# Patient Record
Sex: MALE | Race: WHITE | NOT HISPANIC OR LATINO | URBAN - METROPOLITAN AREA
[De-identification: names, ages, dates, MRNs, and addresses within clinical notes are randomized per-mention and may not be internally consistent; named-entity substitution may affect disease eponyms.]

---

## 2024-09-28 ENCOUNTER — OFFICE VISIT (OUTPATIENT)
Dept: URGENT CARE | Age: 19
End: 2024-09-28
Payer: COMMERCIAL

## 2024-09-28 VITALS
OXYGEN SATURATION: 98 % | HEART RATE: 62 BPM | TEMPERATURE: 97 F | RESPIRATION RATE: 16 BRPM | SYSTOLIC BLOOD PRESSURE: 136 MMHG | DIASTOLIC BLOOD PRESSURE: 80 MMHG

## 2024-09-28 DIAGNOSIS — L08.9 SKIN INFECTION: Primary | ICD-10-CM

## 2024-09-28 RX ORDER — SULFAMETHOXAZOLE AND TRIMETHOPRIM 800; 160 MG/1; MG/1
1 TABLET ORAL 2 TIMES DAILY
Qty: 20 TABLET | Refills: 0 | Status: SHIPPED | OUTPATIENT
Start: 2024-09-28 | End: 2024-10-08

## 2024-09-28 ASSESSMENT — ENCOUNTER SYMPTOMS: WOUND: 1

## 2024-09-28 NOTE — PROGRESS NOTES
Subjective   Patient ID: Derick Caputo is a 19 y.o. male. They present today with a chief complaint of Wound Infection (Concern for infected abrasion, left elbow.).    History of Present Illness  Pt reported having an abrasion from a carpet burn on the left elbow, redness, swelling and pain rated 1/10 for 3 days. Pt denied bleeding and discharge. Neosporin cream did not improve the symptoms.       History provided by:  Patient   used: No        Past Medical History  Allergies as of 09/28/2024    (No Known Allergies)       (Not in a hospital admission)       History reviewed. No pertinent past medical history.    History reviewed. No pertinent surgical history.         Review of Systems  Review of Systems   Skin:  Positive for wound.                                  Objective    Vitals:    09/28/24 1342   BP: 136/80   Pulse: 62   Resp: 16   Temp: 36.1 °C (97 °F)   SpO2: 98%     No LMP for male patient.    Physical Exam  Vitals and nursing note reviewed.   Constitutional:       Appearance: Normal appearance.   HENT:      Head: Normocephalic and atraumatic.   Cardiovascular:      Rate and Rhythm: Normal rate and regular rhythm.   Pulmonary:      Effort: Pulmonary effort is normal.      Breath sounds: Normal breath sounds.   Skin:     General: Skin is warm.      Comments: An eschar noted on the left elbow. Surrounding tissue with redness, swelling and tenderness.    Neurological:      Mental Status: He is alert.         Procedures    Point of Care Test & Imaging Results from this visit  No results found for this visit on 09/28/24.   No results found.    Diagnostic study results (if any) were reviewed by JOAO Landa.    Assessment/Plan   Allergies, medications, history, and pertinent labs/EKGs/Imaging reviewed by JOAO Landa.     Take the antibiotic with food.  Eat yogurt or take probiotic once a day.  Symptoms should improve in 2 to 3 days.   Take Tylenol and/or ibuprofen  as needed for aches and pain and/or fever.  Return or follow-up with primary care provider if symptoms did not improve.  Call 911 or go to the nearest emergency room if symptoms became severe such as fever of 102.5 degrees Fahrenheit or 39.2 degrees Celsius, severe pain, shortness of breath, chest tightness.   Patient verbalized understanding of the instructions and left in stable condition.        Orders and Diagnoses  Diagnoses and all orders for this visit:  Skin infection      Medical Admin Record      Patient disposition: Home    Electronically signed by JOAO Landa  1:53 PM

## 2024-09-28 NOTE — PATIENT INSTRUCTIONS
Take the antibiotic with food.  Eat yogurt or take probiotic once a day.  Symptoms should improve in 2 to 3 days.   Take Tylenol and/or ibuprofen as needed for aches and pain and/or fever.  Return or follow-up with primary care provider if symptoms did not improve.  Call 911 or go to the nearest emergency room if symptoms became severe such as fever of 102.5 degrees Fahrenheit or 39.2 degrees Celsius, severe pain, shortness of breath, chest tightness.   Patient verbalized understanding of the instructions and left in stable condition.

## 2025-06-15 ENCOUNTER — OFFICE VISIT (OUTPATIENT)
Dept: URGENT CARE | Age: 20
End: 2025-06-15
Payer: COMMERCIAL

## 2025-06-15 VITALS
TEMPERATURE: 98.2 F | BODY MASS INDEX: 22.22 KG/M2 | HEIGHT: 69 IN | SYSTOLIC BLOOD PRESSURE: 112 MMHG | DIASTOLIC BLOOD PRESSURE: 74 MMHG | OXYGEN SATURATION: 99 % | RESPIRATION RATE: 18 BRPM | WEIGHT: 150 LBS | HEART RATE: 75 BPM

## 2025-06-15 DIAGNOSIS — S61.401A: Primary | ICD-10-CM

## 2025-06-15 DIAGNOSIS — Z23 NEED FOR DIPHTHERIA-TETANUS-PERTUSSIS (TDAP) VACCINE: ICD-10-CM

## 2025-06-15 RX ORDER — MUPIROCIN 20 MG/G
OINTMENT TOPICAL
Qty: 22 G | Refills: 0 | Status: SHIPPED | OUTPATIENT
Start: 2025-06-15 | End: 2025-06-25

## 2025-06-15 RX ORDER — CEPHALEXIN 500 MG/1
500 CAPSULE ORAL 2 TIMES DAILY
Qty: 14 CAPSULE | Refills: 0 | Status: SHIPPED | OUTPATIENT
Start: 2025-06-15 | End: 2025-06-22

## 2025-06-15 ASSESSMENT — ENCOUNTER SYMPTOMS: WOUND: 1

## 2025-06-15 NOTE — PROGRESS NOTES
"Subjective   Patient ID: Derick Caputo is a 20 y.o. male. They present today with a chief complaint of Injury (Laceration to RT palm last night after jumping in a bush//Pain).    History of Present Illness  Patient is a 20-year-old male presenting with concern for possible wound infection that was result of an injury yesterday.  He reports jumping through the bushes and cutting his right palm as result of it.  His main symptoms are redness and pain at the site.  He reports the injury took place over 12 hours ago and he just concern for infection.  He denies bleeding at the site, purulent discharge, numbness or tingling.  Patient is unaware of his last Tdap shot.    Past Medical History  Allergies as of 06/15/2025    (No Known Allergies)       Prescriptions Prior to Admission[1]     Medical History[2]    Surgical History[3]     reports that he has never smoked. He has never used smokeless tobacco.    Review of Systems  Review of Systems   Skin:  Positive for wound.   All other systems reviewed and are negative.                                 Objective    Vitals:    06/15/25 1558   BP: 112/74   BP Location: Left arm   Patient Position: Sitting   Pulse: 75   Resp: 18   Temp: 36.8 °C (98.2 °F)   TempSrc: Oral   SpO2: 99%   Weight: 68 kg (150 lb)   Height: 1.753 m (5' 9\")     No LMP for male patient.    Physical Exam  Vitals reviewed.   General: Vitals Noted. No distress. Normocephalic.  Cardiovascular:     Heart sounds: Normal heart sounds, S1 normal and S2 normal. No murmur heard.     No friction rub.   Pulmonary:      Effort: Pulmonary effort is normal.      Breath sounds: Normal breath sounds and air entry. Lungs clear to auscultation bilaterally   Musculoskeletal: Moves all extremities, no effusion, no edema.   ROM: full  Joint effusion: None  Right upper extremity: No edema.  Left upper extremity: No edema.  Right lower leg: No edema.   Left lower leg: No edema.   Skin:     Comments: Skin: Single " superficial laceration to the right palm approximately 4 cm in length with ecchymosis and erythema to the right palm surrounding the laceration site.  Neurological:      Cranial Nerves: Cranial nerves 2-12 are intact.      Sensory: No sensory deficit.      Motor: Motor function is intact.      Deep Tendon Reflexes: Reflexes are normal and symmetric.       Procedures    Point of Care Test & Imaging Results from this visit  No results found for this visit on 06/15/25.   Imaging  No results found.    Cardiology, Vascular, and Other Imaging  No other imaging results found for the past 2 days      Diagnostic study results (if any) were reviewed by JOAO Jain.    Assessment/Plan   Allergies, medications, history, and pertinent labs/EKGs/Imaging reviewed by JOAO Jain.     Medical Decision Making  Patient is alert and oriented x 3 no acute distress.  Presents with concern for the wound to the right palm as a result of an injury while jumping through the wooden bushes and lacerating his right palm.  On presentation examination single superficial laceration to the right palm approximately 4 cm in length with ecchymosis and erythema to the right palm surrounding the laceration site.  Tdap is provided today to the patient as his last one from 2015.  Patient will be started on oral antibiotics as well as topical mostly preventatively in the setting of the dirty wound.  No laceration repair is completed today in office as the edges of proximal with laceration taking place over 16 hours ago.  Patient is educated on signs symptoms significant for repeat evaluation in the setting of bacterial infection.  Patient verbalized understanding and agreed with the plan.    Orders and Diagnoses  Diagnoses and all orders for this visit:  Open wound of palm, right, initial encounter  -     cephalexin (Keflex) 500 mg capsule; Take 1 capsule (500 mg) by mouth 2 times a day for 7 days.  -     mupirocin (Bactroban) 2 %  ointment; Apply topically 3 times a day for 10 days.  Need for diphtheria-tetanus-pertussis (Tdap) vaccine  -     Tdap vaccine, age 7 years and older  (BOOSTRIX)      Medical Admin Record      Patient disposition: Home    Electronically signed by JOAO Jain  4:43 PM           [1] (Not in a hospital admission)   [2] History reviewed. No pertinent past medical history.  [3] History reviewed. No pertinent surgical history.